# Patient Record
(demographics unavailable — no encounter records)

---

## 2025-06-10 NOTE — ASSESSMENT
[FreeTextEntry1] : A/P: 45 y/o female with primary biliary cholangitis on ursodiol 600 mg BID.  - recent ALP elevated. suboptimal PBC response to Ursodiol.  Education and counseling are done re: adjunctive med in addition to Ursodiol to control PBC +fatigue associated with PBC +pruritus associated with PBC  -start Iqirvo which is also beneficial for fatigue and pruritus in addition to PBC Iqirvo 80 mg tab with or without foods  RTO 1 month after labs    # Health Maintenance Immune to Hep A HBcAb- HBsAb<5 HBsAg- Vaccination before but not enough immune to Hep B If pt is immunocompromised or high-risk for hep B, will give a booster shot series (double the dose vaccine or use Heplisav-B). Hep C Ab Negative

## 2025-06-10 NOTE — ASSESSMENT
[FreeTextEntry1] : A/P: 47 y/o female with primary biliary cholangitis on ursodiol 600 mg BID.  - recent ALP elevated. suboptimal PBC response to Ursodiol.  Education and counseling are done re: adjunctive med in addition to Ursodiol to control PBC +fatigue associated with PBC +pruritus associated with PBC  -start Iqirvo which is also beneficial for fatigue and pruritus in addition to PBC Iqirvo 80 mg tab with or without foods  RTO 1 month after labs    # Health Maintenance Immune to Hep A HBcAb- HBsAb<5 HBsAg- Vaccination before but not enough immune to Hep B If pt is immunocompromised or high-risk for hep B, will give a booster shot series (double the dose vaccine or use Heplisav-B). Hep C Ab Negative

## 2025-06-10 NOTE — REVIEW OF SYSTEMS
[Fever] : no fever [Chills] : no chills [Feeling Poorly] : not feeling poorly [Recent Weight Gain (___ Lbs)] : recent [unfilled] ~Ulb weight gain [Feeling Tired] : not feeling tired [Nosebleeds] : no nosebleeds [Nasal Discharge] : no nasal discharge [Hoarseness] : no hoarseness [Sore Throat] : no sore throat [Chest Pain] : no chest pain [Leg Claudication] : no intermittent leg claudication [Palpitations] : no palpitations [Lower Ext Edema] : no lower extremity edema [Shortness Of Breath] : no shortness of breath [Wheezing] : no wheezing [Cough] : no cough [SOB on Exertion] : no shortness of breath during exertion [Abdominal Pain] : abdominal pain [As Noted in HPI] : as noted in HPI [Constipation] : no constipation [Vomiting] : no vomiting [Diarrhea] : no diarrhea [Melena] : no melena [FreeTextEntry7] : flank pain

## 2025-06-10 NOTE — HISTORY OF PRESENT ILLNESS
[FreeTextEntry1] : Thuan Barrios is a 47 y/o female with primary biliary cholangitis on ursodiol 600 mg BID who presents for f/u.  6/10/2025 , 5.5 KPa  2025 AST 20, ALT 26, , TB 1.5 Alb 4.8 TSH 1.19 a1c 5.6 lipase 33  - 24: AST/ALT   8/15/24: Total cholesterol 261, , , HDL 69, SCr 0.52, AST/ALT ; ALKP 174, Alb 4.3, HgbA1c 5.9, , INR 0.9, AFP 3.2 24 outside b/w: AST/ALT 31/54; Alkp 201; Cr 0.5, TB 0.8, IgG 1003; INR 0.9, WBC5.1, H/H 13.6/40.6; plt 328; AFP 3.2  5/3/24 AST/ALT , AlkP 148; ; Sjogren's Ab <1 neg, FRANKO neg, A1C 5.6 - 23   23 outside b/w: 28/38; 191; TB 1  - FIBROSCAN 24: ; kPa 4.2, IQR 18% --> S0; F0 - MRCP done 24: Multiple liver hemangiomas. Otherwise unremarkable abdominal MRI. In particular, no biliary abnormality is identified. - Her weight has been fluctuated.  - Due to HLD, rousuvastatin 10mg was started in 2024.  - PT denies any bone issue.  - Today pt reports pruritus, right flank sharp pain every morning   [Medical Hx] HLD (not on med) [Surgical Hx] abdominoplasty ,  2005 , hysterectomy , right salpingo-oophorectomy  [Social Hx] self-employed, , one sone Alcohol: Social ETOH on special occasion Tobacco: Never illicit drug:No Herb and dietary Supplement: No [FMH of liver disease] Mother has autoimmune disease.  [Labs] 23 AFP 3.8 INR 0.9 Na 142 K 4.5 Alb 4.4 TB 0.7 AST/ALT    IGG 1066  WBC 5.1 Hb 14.2   22 @[facility] [id number] HDL 78 Non  Cholesterol 260 Cr 0.54 Na 142 K 4.1 TB 0.7 AST/ALT   HbA1C 5.4 wbc 4.5 hb 13.3   21  Cr 0.51 Na 139 K 4.9 TB 0.8  AST/ALT  GGTP 120  [Imaging] Fibroscan 23: F0, S0 (E 5.6 kPa.)  May 2022 @[facility]: was told the result was normal  MRE 11/3/2021: Stable study. No evidence of fibrosis, cirrhosis, portal hypertension or HCC. 2.1kPa. No steatosis. No iron overload.  [Procedures] EGD 2019 by Dr. Pemberton: non-bleeding erosive gastropathy. Gastritis. Colonoscopy 2019: one 4mm polyp in the proximal ascending colon, removed. The examined portion of the ileum was normal. REpeat in 10 years.

## 2025-06-10 NOTE — HISTORY OF PRESENT ILLNESS
[FreeTextEntry1] : Thuan Barrios is a 47 y/o female with primary biliary cholangitis on ursodiol 600 mg BID who presents for f/u.  6/10/2025 , 5.5 KPa  2025 AST 20, ALT 26, , TB 1.5 Alb 4.8 TSH 1.19 a1c 5.6 lipase 33  - 24: AST/ALT   8/15/24: Total cholesterol 261, , , HDL 69, SCr 0.52, AST/ALT ; ALKP 174, Alb 4.3, HgbA1c 5.9, , INR 0.9, AFP 3.2 24 outside b/w: AST/ALT 31/54; Alkp 201; Cr 0.5, TB 0.8, IgG 1003; INR 0.9, WBC5.1, H/H 13.6/40.6; plt 328; AFP 3.2  5/3/24 AST/ALT , AlkP 148; ; Sjogren's Ab <1 neg, FRANKO neg, A1C 5.6 - 23   23 outside b/w: 28/38; 191; TB 1  - FIBROSCAN 24: ; kPa 4.2, IQR 18% --> S0; F0 - MRCP done 24: Multiple liver hemangiomas. Otherwise unremarkable abdominal MRI. In particular, no biliary abnormality is identified. - Her weight has been fluctuated.  - Due to HLD, rousuvastatin 10mg was started in 2024.  - PT denies any bone issue.  - Today pt reports pruritus, right flank sharp pain every morning   [Medical Hx] HLD (not on med) [Surgical Hx] abdominoplasty ,  2005 , hysterectomy , right salpingo-oophorectomy  [Social Hx] self-employed, , one sone Alcohol: Social ETOH on special occasion Tobacco: Never illicit drug:No Herb and dietary Supplement: No [FMH of liver disease] Mother has autoimmune disease.  [Labs] 23 AFP 3.8 INR 0.9 Na 142 K 4.5 Alb 4.4 TB 0.7 AST/ALT    IGG 1066  WBC 5.1 Hb 14.2   22 @[facility] [id number] HDL 78 Non  Cholesterol 260 Cr 0.54 Na 142 K 4.1 TB 0.7 AST/ALT   HbA1C 5.4 wbc 4.5 hb 13.3   21  Cr 0.51 Na 139 K 4.9 TB 0.8  AST/ALT  GGTP 120  [Imaging] Fibroscan 23: F0, S0 (E 5.6 kPa.)  May 2022 @[facility]: was told the result was normal  MRE 11/3/2021: Stable study. No evidence of fibrosis, cirrhosis, portal hypertension or HCC. 2.1kPa. No steatosis. No iron overload.  [Procedures] EGD 2019 by Dr. Pemberton: non-bleeding erosive gastropathy. Gastritis. Colonoscopy 2019: one 4mm polyp in the proximal ascending colon, removed. The examined portion of the ileum was normal. REpeat in 10 years.

## 2025-06-10 NOTE — REVIEW OF SYSTEMS
[Fever] : no fever [Chills] : no chills [Feeling Poorly] : not feeling poorly [Feeling Tired] : not feeling tired [Recent Weight Gain (___ Lbs)] : recent [unfilled] ~Ulb weight gain [Nosebleeds] : no nosebleeds [Nasal Discharge] : no nasal discharge [Sore Throat] : no sore throat [Hoarseness] : no hoarseness [Chest Pain] : no chest pain [Palpitations] : no palpitations [Leg Claudication] : no intermittent leg claudication [Lower Ext Edema] : no lower extremity edema [Shortness Of Breath] : no shortness of breath [Wheezing] : no wheezing [Cough] : no cough [SOB on Exertion] : no shortness of breath during exertion [As Noted in HPI] : as noted in HPI [Abdominal Pain] : abdominal pain [Constipation] : no constipation [Vomiting] : no vomiting [Diarrhea] : no diarrhea [FreeTextEntry7] : flank pain [Melena] : no melena

## 2025-06-10 NOTE — PHYSICAL EXAM
[Abdominal  Ascites] : no ascites [Scleral Icterus] : No Scleral Icterus [Non-Tender] : non-tender [Asterixis] : no asterixis observed [Jaundice] : No jaundice [General Appearance - Alert] : alert [General Appearance - In No Acute Distress] : in no acute distress [General Appearance - Well Developed] : well developed [General Appearance - Well Nourished] : well nourished [Sclera] : the sclera and conjunctiva were normal [Neck Appearance] : the appearance of the neck was normal [Neck Cervical Mass (___cm)] : no neck mass was observed [] : no respiratory distress [Respiration, Rhythm And Depth] : normal respiratory rhythm and effort [Exaggerated Use Of Accessory Muscles For Inspiration] : no accessory muscle use [Heart Rate And Rhythm] : heart rate was normal and rhythm regular [Apical Impulse] : the apical impulse was normal [Heart Sounds] : normal S1 and S2 [Edema] : there was no peripheral edema [Veins - Varicosity Changes] : there were no varicosital changes [Bowel Sounds] : normal bowel sounds [Abdomen Soft] : soft [Abdomen Tenderness] : non-tender [Abdomen Mass (___ Cm)] : no abdominal mass palpated [Abnormal Walk] : normal gait [No Focal Deficits] : no focal deficits [Impaired Insight] : insight and judgment were intact [Oriented To Time, Place, And Person] : oriented to person, place, and time [Mood] : the mood was normal [Affect] : the affect was normal

## 2025-06-10 NOTE — PHYSICAL EXAM
[Scleral Icterus] : No Scleral Icterus [Abdominal  Ascites] : no ascites [Non-Tender] : non-tender [Asterixis] : no asterixis observed [Jaundice] : No jaundice [General Appearance - Alert] : alert [General Appearance - In No Acute Distress] : in no acute distress [General Appearance - Well Nourished] : well nourished [General Appearance - Well Developed] : well developed [Sclera] : the sclera and conjunctiva were normal [Neck Appearance] : the appearance of the neck was normal [Neck Cervical Mass (___cm)] : no neck mass was observed [] : no respiratory distress [Respiration, Rhythm And Depth] : normal respiratory rhythm and effort [Exaggerated Use Of Accessory Muscles For Inspiration] : no accessory muscle use [Apical Impulse] : the apical impulse was normal [Heart Rate And Rhythm] : heart rate was normal and rhythm regular [Heart Sounds] : normal S1 and S2 [Edema] : there was no peripheral edema [Veins - Varicosity Changes] : there were no varicosital changes [Bowel Sounds] : normal bowel sounds [Abdomen Soft] : soft [Abdomen Tenderness] : non-tender [Abnormal Walk] : normal gait [Abdomen Mass (___ Cm)] : no abdominal mass palpated [No Focal Deficits] : no focal deficits [Impaired Insight] : insight and judgment were intact [Oriented To Time, Place, And Person] : oriented to person, place, and time [Affect] : the affect was normal [Mood] : the mood was normal

## 2025-07-25 NOTE — HISTORY OF PRESENT ILLNESS
[FreeTextEntry1] : Thuan Barrios is a 45 y/o female with primary biliary cholangitis on ursodiol 600 mg BID who presents for f/u.She is on Iqirvo since 2025.   Most recent Lab  ALT 53  TB 1.1   6/10/2025 , 5.5 KPa  2025 AST 20, ALT 26, , TB 1.5 Alb 4.8 TSH 1.19 a1c 5.6 lipase 33  - 24: AST/ALT   8/15/24: Total cholesterol 261, , , HDL 69, SCr 0.52, AST/ALT ; ALKP 174, Alb 4.3, HgbA1c 5.9, , INR 0.9, AFP 3.2 24 outside b/w: AST/ALT 31/54; Alkp 201; Cr 0.5, TB 0.8, IgG 1003; INR 0.9, WBC5.1, H/H 13.6/40.6; plt 328; AFP 3.2  5/3/24 AST/ALT , AlkP 148; ; Sjogren's Ab <1 neg, FRANKO neg, A1C 5.6 - 23   23 outside b/w: 28/38; 191; TB 1  - FIBROSCAN 24: ; kPa 4.2, IQR 18% --> S0; F0 - MRCP done 24: Multiple liver hemangiomas. Otherwise unremarkable abdominal MRI. In particular, no biliary abnormality is identified. - Her weight has been fluctuated.  - Due to HLD, rousuvastatin 10mg was started in 2024.  - PT denies any bone issue.  - Today pt reports pruritus, right flank sharp pain every morning   [Medical Hx] HLD (not on med) [Surgical Hx] abdominoplasty ,  2005 , hysterectomy , right salpingo-oophorectomy  [Social Hx] self-employed, , one sone Alcohol: Social ETOH on special occasion Tobacco: Never illicit drug:No Herb and dietary Supplement: No [FMH of liver disease] Mother has autoimmune disease.  [Labs] 23 AFP 3.8 INR 0.9 Na 142 K 4.5 Alb 4.4 TB 0.7 AST/ALT    IGG 1066  WBC 5.1 Hb 14.2   22 @[facility] [id number] HDL 78 Non  Cholesterol 260 Cr 0.54 Na 142 K 4.1 TB 0.7 AST/ALT   HbA1C 5.4 wbc 4.5 hb 13.3   21  Cr 0.51 Na 139 K 4.9 TB 0.8  AST/ALT  GGTP 120  [Imaging] Fibroscan 23: F0, S0 (E 5.6 kPa.)  May 2022 @[facility]: was told the result was normal  MRE 11/3/2021: Stable study. No evidence of fibrosis, cirrhosis, portal hypertension or HCC. 2.1kPa. No steatosis. No iron overload.  [Procedures] EGD 2019 by Dr. Pemberton: non-bleeding erosive gastropathy. Gastritis. Colonoscopy 2019: one 4mm polyp in the proximal ascending colon, removed. The examined portion of the ileum was normal. REpeat in 10 years.

## 2025-07-25 NOTE — ASSESSMENT
[FreeTextEntry1] : A/P: 45 y/o female with primary biliary cholangitis on ursodiol 600 mg BID.  On iqirvo alp normalized also on Zepbound for overweight  She wants to hold Iqirvo due to the cost.  She will hold for few months   RTO 3-4 mo   ---------------------------------- Addendum: From PharmD team.  Her copay for the Iqirvo was $30/month, and we did inform her that there is a lot of financial assistance program to help with this medication. Which we can help her investigate so we tried to offer her reassurance so that the cost of the medication doesn't factor in too much in her decision to stop the medication. Ultimately, she said it's the lack of perceived benefit and her wanting to just focus on her weight management and cholesterol management and limiting the amount of medications that she takes, that she is choosing to stop the Iqirvo. - Pharmacy input appreciated

## 2025-07-25 NOTE — HISTORY OF PRESENT ILLNESS
[FreeTextEntry1] : Thuan Barrios is a 45 y/o female with primary biliary cholangitis on ursodiol 600 mg BID who presents for f/u.She is on Iqirvo since 2025.   Most recent Lab  ALT 53  TB 1.1   6/10/2025 , 5.5 KPa  2025 AST 20, ALT 26, , TB 1.5 Alb 4.8 TSH 1.19 a1c 5.6 lipase 33  - 24: AST/ALT   8/15/24: Total cholesterol 261, , , HDL 69, SCr 0.52, AST/ALT ; ALKP 174, Alb 4.3, HgbA1c 5.9, , INR 0.9, AFP 3.2 24 outside b/w: AST/ALT 31/54; Alkp 201; Cr 0.5, TB 0.8, IgG 1003; INR 0.9, WBC5.1, H/H 13.6/40.6; plt 328; AFP 3.2  5/3/24 AST/ALT , AlkP 148; ; Sjogren's Ab <1 neg, FRANKO neg, A1C 5.6 - 23   23 outside b/w: 28/38; 191; TB 1  - FIBROSCAN 24: ; kPa 4.2, IQR 18% --> S0; F0 - MRCP done 24: Multiple liver hemangiomas. Otherwise unremarkable abdominal MRI. In particular, no biliary abnormality is identified. - Her weight has been fluctuated.  - Due to HLD, rousuvastatin 10mg was started in 2024.  - PT denies any bone issue.  - Today pt reports pruritus, right flank sharp pain every morning   [Medical Hx] HLD (not on med) [Surgical Hx] abdominoplasty ,  2005 , hysterectomy , right salpingo-oophorectomy  [Social Hx] self-employed, , one sone Alcohol: Social ETOH on special occasion Tobacco: Never illicit drug:No Herb and dietary Supplement: No [FMH of liver disease] Mother has autoimmune disease.  [Labs] 23 AFP 3.8 INR 0.9 Na 142 K 4.5 Alb 4.4 TB 0.7 AST/ALT    IGG 1066  WBC 5.1 Hb 14.2   22 @[facility] [id number] HDL 78 Non  Cholesterol 260 Cr 0.54 Na 142 K 4.1 TB 0.7 AST/ALT   HbA1C 5.4 wbc 4.5 hb 13.3   21  Cr 0.51 Na 139 K 4.9 TB 0.8  AST/ALT  GGTP 120  [Imaging] Fibroscan 23: F0, S0 (E 5.6 kPa.)  May 2022 @[facility]: was told the result was normal  MRE 11/3/2021: Stable study. No evidence of fibrosis, cirrhosis, portal hypertension or HCC. 2.1kPa. No steatosis. No iron overload.  [Procedures] EGD 2019 by Dr. Pemberton: non-bleeding erosive gastropathy. Gastritis. Colonoscopy 2019: one 4mm polyp in the proximal ascending colon, removed. The examined portion of the ileum was normal. REpeat in 10 years.

## 2025-07-25 NOTE — PHYSICAL EXAM
[Non-Tender] : non-tender [General Appearance - Alert] : alert [General Appearance - In No Acute Distress] : in no acute distress [General Appearance - Well Nourished] : well nourished [General Appearance - Well Developed] : well developed [Sclera] : the sclera and conjunctiva were normal [Neck Appearance] : the appearance of the neck was normal [Neck Cervical Mass (___cm)] : no neck mass was observed [] : no respiratory distress [Respiration, Rhythm And Depth] : normal respiratory rhythm and effort [Exaggerated Use Of Accessory Muscles For Inspiration] : no accessory muscle use [Apical Impulse] : the apical impulse was normal [Heart Rate And Rhythm] : heart rate was normal and rhythm regular [Heart Sounds] : normal S1 and S2 [Edema] : there was no peripheral edema [Veins - Varicosity Changes] : there were no varicosital changes [Bowel Sounds] : normal bowel sounds [Abdomen Soft] : soft [Abdomen Tenderness] : non-tender [Abdomen Mass (___ Cm)] : no abdominal mass palpated [Abnormal Walk] : normal gait [No Focal Deficits] : no focal deficits [Oriented To Time, Place, And Person] : oriented to person, place, and time [Impaired Insight] : insight and judgment were intact [Affect] : the affect was normal [Mood] : the mood was normal [Scleral Icterus] : No Scleral Icterus [Abdominal  Ascites] : no ascites [Asterixis] : no asterixis observed [Jaundice] : No jaundice

## 2025-07-25 NOTE — REVIEW OF SYSTEMS
[Recent Weight Gain (___ Lbs)] : recent [unfilled] ~Ulb weight gain [As Noted in HPI] : as noted in HPI [Abdominal Pain] : abdominal pain [Fever] : no fever [Chills] : no chills [Feeling Poorly] : not feeling poorly [Feeling Tired] : not feeling tired [Nosebleeds] : no nosebleeds [Nasal Discharge] : no nasal discharge [Sore Throat] : no sore throat [Hoarseness] : no hoarseness [Chest Pain] : no chest pain [Palpitations] : no palpitations [Leg Claudication] : no intermittent leg claudication [Lower Ext Edema] : no lower extremity edema [Shortness Of Breath] : no shortness of breath [Wheezing] : no wheezing [Cough] : no cough [SOB on Exertion] : no shortness of breath during exertion [Vomiting] : no vomiting [Constipation] : no constipation [Diarrhea] : no diarrhea [Melena] : no melena [FreeTextEntry7] : flank pain

## 2025-07-25 NOTE — ASSESSMENT
[FreeTextEntry1] : A/P: 47 y/o female with primary biliary cholangitis on ursodiol 600 mg BID.  On iqirvo alp normalized also on Zepbound for overweight  She wants to hold Iqirvo due to the cost.  She will hold for few months   RTO 3-4 mo   ---------------------------------- Addendum: From PharmD team.  Her copay for the Iqirvo was $30/month, and we did inform her that there is a lot of financial assistance program to help with this medication. Which we can help her investigate so we tried to offer her reassurance so that the cost of the medication doesn't factor in too much in her decision to stop the medication. Ultimately, she said it's the lack of perceived benefit and her wanting to just focus on her weight management and cholesterol management and limiting the amount of medications that she takes, that she is choosing to stop the Iqirvo. - Pharmacy input appreciated